# Patient Record
Sex: MALE | Race: WHITE | NOT HISPANIC OR LATINO
[De-identification: names, ages, dates, MRNs, and addresses within clinical notes are randomized per-mention and may not be internally consistent; named-entity substitution may affect disease eponyms.]

---

## 2017-11-17 PROBLEM — Z00.00 ENCOUNTER FOR PREVENTIVE HEALTH EXAMINATION: Status: ACTIVE | Noted: 2017-11-17

## 2018-01-11 ENCOUNTER — APPOINTMENT (OUTPATIENT)
Dept: OTOLARYNGOLOGY | Facility: CLINIC | Age: 33
End: 2018-01-11

## 2022-03-29 ENCOUNTER — OUTPATIENT (OUTPATIENT)
Dept: OUTPATIENT SERVICES | Facility: HOSPITAL | Age: 37
LOS: 1 days | End: 2022-03-29
Payer: MEDICAID

## 2022-03-29 PROCEDURE — 93010 ELECTROCARDIOGRAM REPORT: CPT

## 2022-03-30 DIAGNOSIS — Z01.812 ENCOUNTER FOR PREPROCEDURAL LABORATORY EXAMINATION: ICD-10-CM

## 2022-03-30 DIAGNOSIS — S83.512A SPRAIN OF ANTERIOR CRUCIATE LIGAMENT OF LEFT KNEE, INITIAL ENCOUNTER: ICD-10-CM

## 2022-03-30 DIAGNOSIS — Z01.810 ENCOUNTER FOR PREPROCEDURAL CARDIOVASCULAR EXAMINATION: ICD-10-CM

## 2022-03-30 DIAGNOSIS — M71.22 SYNOVIAL CYST OF POPLITEAL SPACE [BAKER], LEFT KNEE: ICD-10-CM

## 2022-04-07 ENCOUNTER — OUTPATIENT (OUTPATIENT)
Dept: OUTPATIENT SERVICES | Facility: HOSPITAL | Age: 37
LOS: 1 days | End: 2022-04-07

## 2022-04-07 ENCOUNTER — OUTPATIENT (OUTPATIENT)
Dept: OUTPATIENT SERVICES | Facility: HOSPITAL | Age: 37
LOS: 1 days | End: 2022-04-07
Payer: MEDICAID

## 2022-04-07 PROCEDURE — 88304 TISSUE EXAM BY PATHOLOGIST: CPT | Mod: 26
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
## 2022-04-16 DIAGNOSIS — K21.9 GASTRO-ESOPHAGEAL REFLUX DISEASE WITHOUT ESOPHAGITIS: ICD-10-CM

## 2022-04-16 DIAGNOSIS — I10 ESSENTIAL (PRIMARY) HYPERTENSION: ICD-10-CM

## 2022-04-16 DIAGNOSIS — F32.9 MAJOR DEPRESSIVE DISORDER, SINGLE EPISODE, UNSPECIFIED: ICD-10-CM

## 2022-04-16 DIAGNOSIS — S83.512A SPRAIN OF ANTERIOR CRUCIATE LIGAMENT OF LEFT KNEE, INITIAL ENCOUNTER: ICD-10-CM
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
## 2022-04-19 DIAGNOSIS — S83.512A SPRAIN OF ANTERIOR CRUCIATE LIGAMENT OF LEFT KNEE, INITIAL ENCOUNTER: ICD-10-CM

## 2024-04-03 ENCOUNTER — APPOINTMENT (OUTPATIENT)
Dept: ORTHOPEDIC SURGERY | Facility: CLINIC | Age: 39
End: 2024-04-03
Payer: COMMERCIAL

## 2024-04-03 VITALS — HEIGHT: 69 IN | WEIGHT: 240 LBS | BODY MASS INDEX: 35.55 KG/M2

## 2024-04-03 DIAGNOSIS — Z87.19 PERSONAL HISTORY OF OTHER DISEASES OF THE DIGESTIVE SYSTEM: ICD-10-CM

## 2024-04-03 DIAGNOSIS — Z72.89 OTHER PROBLEMS RELATED TO LIFESTYLE: ICD-10-CM

## 2024-04-03 DIAGNOSIS — Z86.59 PERSONAL HISTORY OF OTHER MENTAL AND BEHAVIORAL DISORDERS: ICD-10-CM

## 2024-04-03 DIAGNOSIS — F43.10 POST-TRAUMATIC STRESS DISORDER, UNSPECIFIED: ICD-10-CM

## 2024-04-03 DIAGNOSIS — Z86.79 PERSONAL HISTORY OF OTHER DISEASES OF THE CIRCULATORY SYSTEM: ICD-10-CM

## 2024-04-03 PROCEDURE — 72040 X-RAY EXAM NECK SPINE 2-3 VW: CPT

## 2024-04-03 PROCEDURE — 99203 OFFICE O/P NEW LOW 30 MIN: CPT

## 2024-04-03 RX ORDER — BUPROPION HYDROCHLORIDE 100 MG/1
100 TABLET, FILM COATED ORAL
Refills: 0 | Status: ACTIVE | COMMUNITY

## 2024-04-03 RX ORDER — MULTIVITAMIN
TABLET ORAL
Refills: 0 | Status: ACTIVE | COMMUNITY

## 2024-04-03 RX ORDER — ALPRAZOLAM 0.5 MG/1
0.5 TABLET ORAL
Refills: 0 | Status: ACTIVE | COMMUNITY
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
## 2024-04-04 NOTE — PHYSICAL EXAM
[4___] : left dorsiflexors 4[unfilled]/5 [Flexion] : flexion [Bending to left] : bending to left [Extension] : extension [Bending to right] : bending to right [5___] : right plantor flexors 5[unfilled]/5 [de-identified] : Constitutional: - General Appearance: Unremarkable Body Habitus Well Developed Well Nourished Body Habitus No Deformities Well Groomed Ability To communicate: Normal Neurologic: Global sensation is intact to upper and lower extremities. See examination of Neck and/or Spine for exceptions. Orientation to Time, Place and Person is: Normal Mood And Affect is Normal Skin: - Head/Face, Right Upper/Lower Extremity, Left Upper/Lower Extremity: Normal See Examination of Neck and/or Spine for exceptions Cardiovascular: Peripheral Cardiovascular System is Normal Palpation of Lymph Nodes: Normal Palpation of lymph nodes in: Axilla, Cervical, Inguinal Abnormal Palpation of lymph nodes in: None  [] : clonus not sustained at ankle [de-identified] : walks with limp.  [de-identified] : altered sensation distal part of LLE.  [TWNoteComboBox7] : forward flexion 30 degrees [de-identified] : extension 10 degrees [de-identified] : left lateral rotation 15 degrees [TWNoteComboBox6] : right lateral rotation 15 degrees

## 2024-04-04 NOTE — DATA REVIEWED
[FreeTextEntry1] : On my interpretations of these images from [facility] in [place] on 04/03/2024: I have independently reviewed and interpreted these images and reports. 2 V cervical XR- normal lordosis, normal alignment, no fxs.   I have independently reviewed and interpreted these images and reports. LUMBAR CT from  on 3/30/23- multilevel disc disease and degenerative changes from L2-S1 with FS at multiple levels.

## 2024-04-12 ENCOUNTER — RESULT REVIEW (OUTPATIENT)
Age: 39
End: 2024-04-12

## 2024-04-24 ENCOUNTER — APPOINTMENT (OUTPATIENT)
Dept: ORTHOPEDIC SURGERY | Facility: CLINIC | Age: 39
End: 2024-04-24
Payer: COMMERCIAL

## 2024-04-24 VITALS — WEIGHT: 240 LBS | BODY MASS INDEX: 35.55 KG/M2 | HEIGHT: 69 IN

## 2024-04-24 DIAGNOSIS — M54.12 RADICULOPATHY, CERVICAL REGION: ICD-10-CM

## 2024-04-24 DIAGNOSIS — M50.20 OTHER CERVICAL DISC DISPLACEMENT, UNSPECIFIED CERVICAL REGION: ICD-10-CM

## 2024-04-24 PROCEDURE — 99214 OFFICE O/P EST MOD 30 MIN: CPT
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
## 2024-04-27 NOTE — DISCUSSION/SUMMARY
[de-identified] : 38 Y M  In regard to the cervical: LT C6/7 HNP. Patient was provided with a referral for cervical physical therapy to work on stretching, strengthening and range of motion. Patient was provided with a cervical home exercise program. I am referring the pt to Dr. Melendez for CESIs  In regard to the lumbar:  Multilevel DDD and moderate stenosis. Patient was provided with a referral for lumbar physical therapy to work on stretching, strengthening and range of motion. Patient was provided with a lumbar home exercise program. I am referring the pt to Dr. Awad for a LT L4-5 TFESI.    Discussed proper body mechanics and modified physical activity to avoid aggravation of symptoms.  Patient was educated and informed on their condition along with the expected outcomes.   F/U PRN.    Prior to appointment and during encounter with patient extensive medical records were reviewed including but not limited to, hospital records, out patient records, imaging results, and lab data. During this appointment the patient was examined, diagnoses were discussed and explained in a face to face manner. In addition extensive time was spent reviewing aforementioned diagnostic studies. Counseling including abnormal image results, differential diagnoses, treatment options, risk and benefits, lifestyle changes, current condition, and current medications was performed. Patient's comments, questions, and concerns were address and patient verbalized understanding. Based on this patient's presentation at our office, which is an orthopedic spine surgeon's office, this patient inherently / intrinsically has a risk, however minute, of developing issues such as Cauda equina syndrome, bowel and bladder changes, or progression of motor or neurological deficits such as paralysis which may be permanent.   I, Marni Trejo, attest that this documentation has been prepared under the direction and in the presence of provider Nikolai Kolb MD.

## 2024-04-27 NOTE — DATA REVIEWED
[FreeTextEntry1] : On my interpretation of these images from [facility] on 4/12/24.  I have additionally reviewed the radiologist report. C2-3: mild ddd, small protrusion lt, mild lt stenosis.  C3-4: mild ddd disc bulge, no stenosis.  C4-5: mild ddd, mild rt fs C5-6: central to rt sided disc protrusion, mild rt fs abutment of cord w/o compression.  C6-7: central disc protrusion extending over to LT side resulting in lt sided root compression and severe LT fs  C7-T1:  mild DDD  On my interpretation of these images from  on 4/12/24.   I have additionally reviewed the radiologist report. L5-S1: mild-mod DDD, central protrusion. mild-mod central stenosis. mod BL FS.  L4-5: mod DDD central protrusion, mild-mod central stenosis, mild FS.  L3-4: mod DDD, broad based protrusion, lt mod rt mild FS,  L2-3: mod DDD, broad based protrusion, mild fs,  L1-2: small lt sided protrusion, no significant stenosis  T12-L1:  nl      On my interpretations of these images from [facility] in [place] on 04/03/2024: I have independently reviewed and interpreted these images and reports. 2 V cervical XR- normal lordosis, normal alignment, no fxs.   I have independently reviewed and interpreted these images and reports. LUMBAR CT from  on 3/30/23- multilevel disc disease and degenerative changes from L2-S1 with FS at multiple levels.

## 2024-05-21 ENCOUNTER — APPOINTMENT (OUTPATIENT)
Dept: PAIN MANAGEMENT | Facility: CLINIC | Age: 39
End: 2024-05-21

## 2024-06-11 ENCOUNTER — APPOINTMENT (OUTPATIENT)
Dept: PAIN MANAGEMENT | Facility: CLINIC | Age: 39
End: 2024-06-11
Payer: COMMERCIAL

## 2024-06-11 VITALS — HEIGHT: 69 IN | WEIGHT: 242 LBS | BODY MASS INDEX: 35.84 KG/M2

## 2024-06-11 DIAGNOSIS — M47.816 SPONDYLOSIS W/OUT MYELOPATHY OR RADICULOPATHY, LUMBAR REGION: ICD-10-CM

## 2024-06-11 DIAGNOSIS — M48.061 SPINAL STENOSIS, LUMBAR REGION WITHOUT NEUROGENIC CLAUDICATION: ICD-10-CM

## 2024-06-11 DIAGNOSIS — M79.18 MYALGIA, OTHER SITE: ICD-10-CM

## 2024-06-11 DIAGNOSIS — M54.16 RADICULOPATHY, LUMBAR REGION: ICD-10-CM

## 2024-06-11 PROCEDURE — 99204 OFFICE O/P NEW MOD 45 MIN: CPT

## 2024-06-11 NOTE — PHYSICAL EXAM
[de-identified] : Constitutional:   - No acute distress   - Obese  Neurological:   - normal mood and affect   - alert and oriented x 3     Cardiovascular:   - grossly normal   Lumbar Spine Exam:   Inspection: erythema (-) ecchymosis (-) rashes (-) alignment: no scoliosis   Palpation: Midline lumbar tenderness:            (-) midline thoracic tenderness:          (-) Lumbar paraspinal tenderness:  L (-) ; R (-) thoracic paraspinal tenderness: L (-) ; R (-) sciatic nerve tenderness :          L (+) ; R (-) SI joint tenderness:                     L (-) ; R (-) GTB tenderness:                        L (-);  R (-)   ROM: Reduced all planes  pain with flexion > extension   Strength:                                    Right       Left    Hip Flexion:                (5/5)       (5/5) Quadriceps:               (5/5)       (5/5) Hamstrings:                (5/5)       (5/5) Ankle Dorsiflexion:     (5/5)       (5/5) EHL:                           (5/5)       (5/5) Ankle Plantarflexion:  (5/5)       (5/5)   Special Tests: SLR:                            R (-) ; L (+) Facet loading:             R (-) ; L (-) EL test:                R (N/A) ; L (N/A) Hamstring tightness:   R (-);  L (-)   Neurologic: SILT throughout right lower extremity with exception of left L4, L5 distribution SILT throughout left lower extremity   Reflexes normal and symmetric bilateral lower extremities   Gait: Antalgic gait ambulates with assistance of cane

## 2024-06-11 NOTE — ASSESSMENT
[FreeTextEntry1] : A discussion regarding available pain management treatment options occurred with the patient.  These included interventional, rehabilitative, pharmacological, and alternative modalities. We will proceed with the following:    Interventional treatment options:   - Proceed with left L4-L5 TFESI with fluoroscopic guidance - Patient wishes to discuss cervical spine once adequate relief of lumbar spine pain - see additional instructions below    Rehabilitative options:   - Patient completed extensive physical therapy; he reports benefits have been exhausted - participation in active HEP was discussed and encouraged as tolerated  Medication based treatment options:   - initiate trial of methocarbamol 750 mg up to TID as needed for spasm - continue ibuprofen 400-600 mg up to TID as needed - see additional instructions below    Complementary treatment options:   - Weight management and lifestyle modifications discussed   - Continue chiropractic care as directed   Additional treatment recommendations as follows:   - Patient will follow-up with Dr. Kolb as directed - Activity/work restrictions as per orthopedic surgery - Follow up 1-2 weeks post injection for assessment of efficacy and further treatment recommendations  The risks, benefits and alternatives of the proposed procedure were explained in detail with the patient. The risks outlined include but are not limited to infection, bleeding, post- dural puncture headache, nerve injury, a temporary increase in pain, failure to resolve symptoms, need for future interventions, allergic reaction, and possible elevation of blood sugar in diabetics if using corticosteroid.  All questions were answered to patient's apparent satisfaction, and he/she verbalized an understanding.  We have discussed the risks, benefits, and alternatives for NSAID therapy including but not limited to the risk of bleeding, thrombosis, gastric mucosal irritation/ulceration, allergic reaction and kidney dysfunction.  The patient verbalizes an understanding.  The documentation recorded by the scribe, in my presence, accurately reflects the service I personally performed and the decisions made by me with my edits as appropriate.   I, Singh Robertson acting as scribe, attest that this documentation has been prepared under the direction and in the presence of Provider Germán Melendez DO.
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
## 2024-07-03 ENCOUNTER — APPOINTMENT (OUTPATIENT)
Dept: PAIN MANAGEMENT | Facility: CLINIC | Age: 39
End: 2024-07-03

## 2024-07-16 ENCOUNTER — APPOINTMENT (OUTPATIENT)
Dept: PAIN MANAGEMENT | Facility: CLINIC | Age: 39
End: 2024-07-16